# Patient Record
Sex: MALE | Race: WHITE
[De-identification: names, ages, dates, MRNs, and addresses within clinical notes are randomized per-mention and may not be internally consistent; named-entity substitution may affect disease eponyms.]

---

## 2021-04-02 ENCOUNTER — HOSPITAL ENCOUNTER (OUTPATIENT)
Dept: HOSPITAL 76 - COV | Age: 31
Discharge: HOME | End: 2021-04-02
Attending: FAMILY MEDICINE
Payer: COMMERCIAL

## 2021-04-02 DIAGNOSIS — R68.83: ICD-10-CM

## 2021-04-02 DIAGNOSIS — R06.02: Primary | ICD-10-CM

## 2021-04-02 DIAGNOSIS — M79.10: ICD-10-CM

## 2021-04-02 DIAGNOSIS — Z20.822: ICD-10-CM

## 2021-04-02 DIAGNOSIS — R53.83: ICD-10-CM

## 2021-04-02 DIAGNOSIS — J34.89: ICD-10-CM

## 2022-04-29 ENCOUNTER — HOSPITAL ENCOUNTER (OUTPATIENT)
Dept: HOSPITAL 76 - LAB.S | Age: 32
Discharge: HOME | End: 2022-04-29
Attending: NURSE PRACTITIONER
Payer: COMMERCIAL

## 2022-04-29 DIAGNOSIS — Z00.8: Primary | ICD-10-CM

## 2022-04-29 DIAGNOSIS — E66.9: ICD-10-CM

## 2022-04-29 LAB
ALBUMIN DIAFP-MCNC: 4.7 G/DL (ref 3.2–5.5)
ALBUMIN/GLOB SERPL: 1.7 {RATIO} (ref 1–2.2)
ALP SERPL-CCNC: 44 IU/L (ref 42–121)
ALT SERPL W P-5'-P-CCNC: 31 IU/L (ref 10–60)
ANION GAP SERPL CALCULATED.4IONS-SCNC: 6 MMOL/L (ref 6–13)
AST SERPL W P-5'-P-CCNC: 26 IU/L (ref 10–42)
BASOPHILS NFR BLD AUTO: 0.1 10^3/UL (ref 0–0.1)
BASOPHILS NFR BLD AUTO: 0.7 %
BILIRUB BLD-MCNC: 0.7 MG/DL (ref 0.2–1)
BUN SERPL-MCNC: 12 MG/DL (ref 6–20)
CALCIUM UR-MCNC: 10 MG/DL (ref 8.5–10.3)
CHLORIDE SERPL-SCNC: 102 MMOL/L (ref 101–111)
CHOLEST SERPL-MCNC: 182 MG/DL
CLARITY UR REFRACT.AUTO: CLEAR
CO2 SERPL-SCNC: 29 MMOL/L (ref 21–32)
CREAT SERPLBLD-SCNC: 1 MG/DL (ref 0.6–1.2)
EOSINOPHIL # BLD AUTO: 0.1 10^3/UL (ref 0–0.7)
EOSINOPHIL NFR BLD AUTO: 0.9 %
ERYTHROCYTE [DISTWIDTH] IN BLOOD BY AUTOMATED COUNT: 13.1 % (ref 12–15)
EST. AVERAGE GLUCOSE BLD GHB EST-MCNC: 103 MG/DL (ref 70–100)
GFRSERPLBLD MDRD-ARVRAT: 86 ML/MIN/{1.73_M2} (ref 89–?)
GLOBULIN SER-MCNC: 2.8 G/DL (ref 2.1–4.2)
GLUCOSE SERPL-MCNC: 77 MG/DL (ref 70–100)
GLUCOSE UR QL STRIP.AUTO: NEGATIVE MG/DL
HBA1C MFR BLD HPLC: 5.2 % (ref 4.27–6.07)
HCT VFR BLD AUTO: 44.5 % (ref 42–52)
HDLC SERPL-MCNC: 44 MG/DL
HDLC SERPL: 4.1 {RATIO} (ref ?–5)
HGB UR QL STRIP: 14.9 G/DL (ref 14–18)
KETONES UR QL STRIP.AUTO: NEGATIVE MG/DL
LDLC SERPL CALC-MCNC: 106 MG/DL
LDLC/HDLC SERPL: 2.4 {RATIO} (ref ?–3.6)
LYMPHOCYTES # SPEC AUTO: 2.3 10^3/UL (ref 1.5–3.5)
LYMPHOCYTES NFR BLD AUTO: 32.8 %
MCH RBC QN AUTO: 29.7 PG (ref 27–31)
MCHC RBC AUTO-ENTMCNC: 33.5 G/DL (ref 32–36)
MCV RBC AUTO: 88.8 FL (ref 80–94)
MONOCYTES # BLD AUTO: 0.4 10^3/UL (ref 0–1)
MONOCYTES NFR BLD AUTO: 5.5 %
NEUTROPHILS # BLD AUTO: 4.1 10^3/UL (ref 1.5–6.6)
NEUTROPHILS # SNV AUTO: 6.9 X10^3/UL (ref 4.8–10.8)
NEUTROPHILS NFR BLD AUTO: 60 %
NITRITE UR QL STRIP.AUTO: NEGATIVE
NRBC # BLD AUTO: 0 /100WBC
NRBC # BLD AUTO: 0 X10^3/UL
PDW BLD AUTO: 12.1 FL (ref 7.4–11.4)
PH UR STRIP.AUTO: 6 PH (ref 5–7.5)
PLATELET # BLD: 254 10^3/UL (ref 130–450)
POTASSIUM SERPL-SCNC: 4.1 MMOL/L (ref 3.5–5)
PROT SPEC-MCNC: 7.5 G/DL (ref 6.7–8.2)
PROT UR STRIP.AUTO-MCNC: NEGATIVE MG/DL
RBC # UR STRIP.AUTO: (no result) /UL
RBC # URNS HPF: (no result) /HPF (ref 0–5)
RBC MAR: 5.01 10^6/UL (ref 4.7–6.1)
SODIUM SERPLBLD-SCNC: 137 MMOL/L (ref 135–145)
SP GR UR STRIP.AUTO: 1.02 (ref 1–1.03)
SQUAMOUS URNS QL MICRO: (no result)
TRIGL P FAST SERPL-MCNC: 162 MG/DL
TSH SERPL-ACNC: 1.9 UIU/ML (ref 0.34–5.6)
UROBILINOGEN UR QL STRIP.AUTO: (no result) E.U./DL
UROBILINOGEN UR STRIP.AUTO-MCNC: NEGATIVE MG/DL
VLDLC SERPL-SCNC: 32 MG/DL
WBC # UR MANUAL: (no result) /HPF (ref 0–3)

## 2022-04-29 PROCEDURE — 80053 COMPREHEN METABOLIC PANEL: CPT

## 2022-04-29 PROCEDURE — 83036 HEMOGLOBIN GLYCOSYLATED A1C: CPT

## 2022-04-29 PROCEDURE — 87086 URINE CULTURE/COLONY COUNT: CPT

## 2022-04-29 PROCEDURE — 80061 LIPID PANEL: CPT

## 2022-04-29 PROCEDURE — 83721 ASSAY OF BLOOD LIPOPROTEIN: CPT

## 2022-04-29 PROCEDURE — 84443 ASSAY THYROID STIM HORMONE: CPT

## 2022-04-29 PROCEDURE — 36415 COLL VENOUS BLD VENIPUNCTURE: CPT

## 2022-04-29 PROCEDURE — 81001 URINALYSIS AUTO W/SCOPE: CPT

## 2022-04-29 PROCEDURE — 85025 COMPLETE CBC W/AUTO DIFF WBC: CPT

## 2023-03-06 ENCOUNTER — HOSPITAL ENCOUNTER (OUTPATIENT)
Dept: HOSPITAL 76 - DI.S | Age: 33
Discharge: HOME | End: 2023-03-06
Attending: PHYSICIAN ASSISTANT
Payer: COMMERCIAL

## 2023-03-06 DIAGNOSIS — R09.89: ICD-10-CM

## 2023-03-06 DIAGNOSIS — R05.8: Primary | ICD-10-CM

## 2023-03-06 NOTE — XRAY REPORT
PROCEDURE:  Chest 2 View X-Ray

 

INDICATIONS:  PRODUCTIVE COUGH

 

TECHNIQUE:  2 views of the chest were acquired.  

 

COMPARISON:  None.

 

FINDINGS:  

 

Surgical changes and devices:  None.  

 

Lungs and pleura:  No pleural effusions or pneumothorax.  Lungs are clear.  

 

Mediastinum:  Mediastinal contours are normal.  Heart size is normal.  

 

Bones and chest wall:  No suspicious bony abnormalities.  Soft tissues appear unremarkable.  

 

IMPRESSION:  

No acute pulmonary process.

 

Reviewed by: Jackie Lopez MD on 3/6/2023 10:02 AM PST

Approved by: Jackie Lopez MD on 3/6/2023 10:02 AM [facility]

 

 

Station ID:  [id number]

## 2023-05-10 ENCOUNTER — HOSPITAL ENCOUNTER (OUTPATIENT)
Dept: HOSPITAL 76 - LAB.S | Age: 33
Discharge: HOME | End: 2023-05-10
Attending: MASSAGE THERAPIST
Payer: COMMERCIAL

## 2023-05-10 DIAGNOSIS — F33.1: Primary | ICD-10-CM

## 2023-05-10 DIAGNOSIS — F41.9: ICD-10-CM

## 2023-05-10 DIAGNOSIS — F10.10: ICD-10-CM

## 2023-05-10 LAB
ALT SERPL W P-5'-P-CCNC: 53 IU/L (ref 10–60)
ANION GAP SERPL CALCULATED.4IONS-SCNC: 4 MMOL/L (ref 6–13)
AST SERPL W P-5'-P-CCNC: 32 IU/L (ref 10–42)
BASOPHILS NFR BLD AUTO: 0.1 10^3/UL (ref 0–0.1)
BASOPHILS NFR BLD AUTO: 0.9 %
BUN SERPL-MCNC: 12 MG/DL (ref 6–20)
CALCIUM UR-MCNC: 9.4 MG/DL (ref 8.5–10.3)
CHLORIDE SERPL-SCNC: 107 MMOL/L (ref 101–111)
CO2 SERPL-SCNC: 27 MMOL/L (ref 21–32)
CREAT SERPLBLD-SCNC: 0.8 MG/DL (ref 0.6–1.2)
EOSINOPHIL # BLD AUTO: 0.1 10^3/UL (ref 0–0.7)
EOSINOPHIL NFR BLD AUTO: 2.2 %
ERYTHROCYTE [DISTWIDTH] IN BLOOD BY AUTOMATED COUNT: 13 % (ref 12–15)
EST. AVERAGE GLUCOSE BLD GHB EST-MCNC: 108 MG/DL (ref 70–100)
FERRITIN SERPL-MCNC: 49.5 NG/ML (ref 23.9–336.2)
FOLATE SERPL-MCNC: 13.88 NG/ML
GFRSERPLBLD MDRD-ARVRAT: 110 ML/MIN/{1.73_M2} (ref 89–?)
GGT SERPL-CCNC: 56 IU/L (ref 8–55)
GLUCOSE SERPL-MCNC: 115 MG/DL (ref 70–100)
HBA1C MFR BLD HPLC: 5.4 % (ref 4.27–6.07)
HCT VFR BLD AUTO: 46.7 % (ref 42–52)
HGB UR QL STRIP: 15.3 G/DL (ref 14–18)
LYMPHOCYTES # SPEC AUTO: 2 10^3/UL (ref 1.5–3.5)
LYMPHOCYTES NFR BLD AUTO: 37.2 %
MCH RBC QN AUTO: 29.9 PG (ref 27–31)
MCHC RBC AUTO-ENTMCNC: 32.8 G/DL (ref 32–36)
MCV RBC AUTO: 91.4 FL (ref 80–94)
MONOCYTES # BLD AUTO: 0.3 10^3/UL (ref 0–1)
MONOCYTES NFR BLD AUTO: 6 %
NEUTROPHILS # BLD AUTO: 2.9 10^3/UL (ref 1.5–6.6)
NEUTROPHILS # SNV AUTO: 5.5 X10^3/UL (ref 4.8–10.8)
NEUTROPHILS NFR BLD AUTO: 53 %
NRBC # BLD AUTO: 0 /100WBC
NRBC # BLD AUTO: 0 X10^3/UL
PDW BLD AUTO: 11.7 FL (ref 7.4–11.4)
PLATELET # BLD: 255 10^3/UL (ref 130–450)
POTASSIUM SERPL-SCNC: 4.4 MMOL/L (ref 3.5–5)
RBC MAR: 5.11 10^6/UL (ref 4.7–6.1)
SODIUM SERPLBLD-SCNC: 138 MMOL/L (ref 135–145)
T3FREE SERPL-MCNC: 3.28 PG/ML (ref 2.5–3.9)
T3RU NFR SERPL: 40.1 % (ref 32–48.4)
T4 FREE SERPL-MCNC: 0.62 NG/DL (ref 0.58–1.64)
T4 SERPL-MCNC: 5.85 UG/DL (ref 6.09–12.23)
TSH SERPL-ACNC: 1.05 UIU/ML (ref 0.34–5.6)
VIT B12 SERPL-MCNC: 182 PG/ML (ref 180–914)

## 2023-05-10 PROCEDURE — 83036 HEMOGLOBIN GLYCOSYLATED A1C: CPT

## 2023-05-10 PROCEDURE — 84479 ASSAY OF THYROID (T3 OR T4): CPT

## 2023-05-10 PROCEDURE — 84443 ASSAY THYROID STIM HORMONE: CPT

## 2023-05-10 PROCEDURE — 84460 ALANINE AMINO (ALT) (SGPT): CPT

## 2023-05-10 PROCEDURE — 82746 ASSAY OF FOLIC ACID SERUM: CPT

## 2023-05-10 PROCEDURE — 82977 ASSAY OF GGT: CPT

## 2023-05-10 PROCEDURE — 36415 COLL VENOUS BLD VENIPUNCTURE: CPT

## 2023-05-10 PROCEDURE — 85025 COMPLETE CBC W/AUTO DIFF WBC: CPT

## 2023-05-10 PROCEDURE — 82306 VITAMIN D 25 HYDROXY: CPT

## 2023-05-10 PROCEDURE — 84481 FREE ASSAY (FT-3): CPT

## 2023-05-10 PROCEDURE — 82607 VITAMIN B-12: CPT

## 2023-05-10 PROCEDURE — 84436 ASSAY OF TOTAL THYROXINE: CPT

## 2023-05-10 PROCEDURE — 82947 ASSAY GLUCOSE BLOOD QUANT: CPT

## 2023-05-10 PROCEDURE — 84450 TRANSFERASE (AST) (SGOT): CPT

## 2023-05-10 PROCEDURE — 84439 ASSAY OF FREE THYROXINE: CPT

## 2023-05-10 PROCEDURE — 82728 ASSAY OF FERRITIN: CPT

## 2023-05-10 PROCEDURE — 80048 BASIC METABOLIC PNL TOTAL CA: CPT

## 2023-08-30 ENCOUNTER — HOSPITAL ENCOUNTER (OUTPATIENT)
Dept: HOSPITAL 76 - LAB.S | Age: 33
Discharge: HOME | End: 2023-08-30
Attending: MASSAGE THERAPIST
Payer: COMMERCIAL

## 2023-08-30 DIAGNOSIS — F10.10: ICD-10-CM

## 2023-08-30 DIAGNOSIS — F33.1: Primary | ICD-10-CM

## 2023-08-30 LAB
ALT SERPL W P-5'-P-CCNC: 29 IU/L (ref 10–60)
AST SERPL W P-5'-P-CCNC: 29 IU/L (ref 10–42)
EST. AVERAGE GLUCOSE BLD GHB EST-MCNC: 111 MG/DL (ref 70–100)
GLUCOSE SERPL-MCNC: 125 MG/DL (ref 74–104)
HBA1C MFR BLD HPLC: 5.5 % (ref 4.27–6.07)
T3RU NFR SERPL: 40 % (ref 32–48)
TSH SERPL-ACNC: 1.34 UIU/ML (ref 0.34–5.6)

## 2023-08-30 PROCEDURE — 84436 ASSAY OF TOTAL THYROXINE: CPT

## 2023-08-30 PROCEDURE — 84443 ASSAY THYROID STIM HORMONE: CPT

## 2023-08-30 PROCEDURE — 82947 ASSAY GLUCOSE BLOOD QUANT: CPT

## 2023-08-30 PROCEDURE — 83036 HEMOGLOBIN GLYCOSYLATED A1C: CPT

## 2023-08-30 PROCEDURE — 84481 FREE ASSAY (FT-3): CPT

## 2023-08-30 PROCEDURE — 84450 TRANSFERASE (AST) (SGOT): CPT

## 2023-08-30 PROCEDURE — 36415 COLL VENOUS BLD VENIPUNCTURE: CPT

## 2023-08-30 PROCEDURE — 84479 ASSAY OF THYROID (T3 OR T4): CPT

## 2023-08-30 PROCEDURE — 84460 ALANINE AMINO (ALT) (SGPT): CPT

## 2024-02-21 ENCOUNTER — HOSPITAL ENCOUNTER (OUTPATIENT)
Dept: HOSPITAL 76 - LAB.S | Age: 34
Discharge: HOME | End: 2024-02-21
Attending: PHYSICIAN ASSISTANT
Payer: COMMERCIAL

## 2024-02-21 DIAGNOSIS — Z13.9: Primary | ICD-10-CM

## 2024-02-21 LAB
ALBUMIN DIAFP-MCNC: 4.9 G/DL (ref 3.2–5.5)
ALBUMIN/GLOB SERPL: 1.7 {RATIO} (ref 1–2.2)
ALP SERPL-CCNC: 42 IU/L (ref 42–121)
ALT SERPL W P-5'-P-CCNC: 39 IU/L (ref 10–60)
ANION GAP SERPL CALCULATED.4IONS-SCNC: 8 MMOL/L (ref 6–13)
AST SERPL W P-5'-P-CCNC: 28 IU/L (ref 10–42)
BASOPHILS NFR BLD AUTO: 0.1 10^3/UL (ref 0–0.1)
BASOPHILS NFR BLD AUTO: 1 %
BILIRUB BLD-MCNC: 0.8 MG/DL (ref 0.2–1)
BUN SERPL-MCNC: 15 MG/DL (ref 6–20)
CALCIUM UR-MCNC: 10 MG/DL (ref 8.5–10.3)
CHLORIDE SERPL-SCNC: 102 MMOL/L (ref 101–111)
CHOLEST SERPL-MCNC: 249 MG/DL
CO2 SERPL-SCNC: 28 MMOL/L (ref 21–32)
CREAT SERPLBLD-SCNC: 0.9 MG/DL (ref 0.6–1.3)
EOSINOPHIL # BLD AUTO: 0.1 10^3/UL (ref 0–0.7)
EOSINOPHIL NFR BLD AUTO: 1.1 %
ERYTHROCYTE [DISTWIDTH] IN BLOOD BY AUTOMATED COUNT: 13.6 % (ref 12–15)
GFRSERPLBLD MDRD-ARVRAT: 95 ML/MIN/{1.73_M2} (ref 89–?)
GLOBULIN SER-MCNC: 2.9 G/DL (ref 2.1–4.2)
GLUCOSE SERPL-MCNC: 96 MG/DL (ref 74–104)
HCT VFR BLD AUTO: 47.5 % (ref 42–52)
HDLC SERPL-MCNC: 68 MG/DL
HDLC SERPL: 3.7 {RATIO} (ref ?–5)
HGB UR QL STRIP: 15.9 G/DL (ref 14–18)
LDLC SERPL CALC-MCNC: 110 MG/DL
LDLC/HDLC SERPL: 1.6 {RATIO} (ref ?–3.6)
LYMPHOCYTES # SPEC AUTO: 2.2 10^3/UL (ref 1.5–3.5)
LYMPHOCYTES NFR BLD AUTO: 30.5 %
MCH RBC QN AUTO: 31.7 PG (ref 27–31)
MCHC RBC AUTO-ENTMCNC: 33.5 G/DL (ref 32–36)
MCV RBC AUTO: 94.6 FL (ref 80–94)
MONOCYTES # BLD AUTO: 0.5 10^3/UL (ref 0–1)
MONOCYTES NFR BLD AUTO: 6.6 %
NEUTROPHILS # BLD AUTO: 4.3 10^3/UL (ref 1.5–6.6)
NEUTROPHILS # SNV AUTO: 7.1 X10^3/UL (ref 4.8–10.8)
NEUTROPHILS NFR BLD AUTO: 60.4 %
NRBC # BLD AUTO: 0 /100WBC
NRBC # BLD AUTO: 0 X10^3/UL
PDW BLD AUTO: 12.3 FL (ref 7.4–11.4)
PLATELET # BLD: 193 10^3/UL (ref 130–450)
POTASSIUM SERPL-SCNC: 4 MMOL/L (ref 3.5–4.5)
PROT SPEC-MCNC: 7.8 G/DL (ref 6.4–8.9)
RBC MAR: 5.02 10^6/UL (ref 4.7–6.1)
SODIUM SERPLBLD-SCNC: 138 MMOL/L (ref 135–145)
TRIGL P FAST SERPL-MCNC: 355 MG/DL (ref 48–352)
TSH SERPL-ACNC: 3.19 UIU/ML (ref 0.34–5.6)
VLDLC SERPL-SCNC: 71 MG/DL

## 2024-02-21 PROCEDURE — 83721 ASSAY OF BLOOD LIPOPROTEIN: CPT

## 2024-02-21 PROCEDURE — 84443 ASSAY THYROID STIM HORMONE: CPT

## 2024-02-21 PROCEDURE — 80061 LIPID PANEL: CPT

## 2024-02-21 PROCEDURE — 80053 COMPREHEN METABOLIC PANEL: CPT

## 2024-02-21 PROCEDURE — 36415 COLL VENOUS BLD VENIPUNCTURE: CPT

## 2024-02-21 PROCEDURE — 85025 COMPLETE CBC W/AUTO DIFF WBC: CPT

## 2024-04-15 ENCOUNTER — HOSPITAL ENCOUNTER (OUTPATIENT)
Dept: HOSPITAL 76 - DI.S | Age: 34
Discharge: HOME | End: 2024-04-15
Attending: PHYSICIAN ASSISTANT
Payer: COMMERCIAL

## 2024-04-15 DIAGNOSIS — M67.873: Primary | ICD-10-CM

## 2024-04-15 NOTE — XRAY REPORT
PROCEDURE:  Ankle 3+V RT

 

INDICATIONS:  DISORDER OF TENDON OF RIGHT ANKLE AND FOOT

 

TECHNIQUE:  3 views of the ankle were acquired.  

 

COMPARISON:  None.

 

FINDINGS:  

 

Bones:  No fractures or dislocations.  Ankle mortise is normally aligned.  No suspicious bony lesions
.  

 

Soft tissues:  No tibiotalar joint effusion.  Achilles tendon appears normal.  

 

 

IMPRESSION:  

 

No acute bony abnormality.

 

 

 

Reviewed by: Jose Carlos Avila MD on 4/15/2024 4:57 PM PDT

Approved by: Jose Carlos Avila MD on 4/15/2024 4:57 PM PDT

 

 

Station ID:  [id number]

## 2024-04-15 NOTE — XRAY REPORT
PROCEDURE:  Foot 3+V RT

 

INDICATIONS:  DISORDER OF TENDON OF RIGHT ANKLE AND FOOT

 

TECHNIQUE:  3 views of the foot were acquired.  

 

COMPARISON:  None.

 

FINDINGS:  

 

Bones:  No fractures or dislocations.  No suspicious bony lesions.  

 

Soft tissues:  No tibiotalar joint effusion.  Achilles tendon appears normal.  

 

IMPRESSION:  

No acute bony abnormality.

 

Reviewed by: Jose Carlos Avila MD on 4/15/2024 4:56 PM PDT

Approved by: Jose Carlos Avila MD on 4/15/2024 4:56 PM PDT

 

 

Station ID:  [id number]

## 2024-04-25 ENCOUNTER — HOSPITAL ENCOUNTER (OUTPATIENT)
Dept: HOSPITAL 76 - DI.S | Age: 34
Discharge: HOME | End: 2024-04-25
Attending: NURSE PRACTITIONER
Payer: COMMERCIAL

## 2024-04-25 DIAGNOSIS — M79.671: Primary | ICD-10-CM

## 2024-04-25 NOTE — XRAY REPORT
PROCEDURE:  Foot 3+V RT

 

INDICATIONS:  RIGHT FOOT PAIN

 

TECHNIQUE:  3 views of the foot were acquired.  

 

COMPARISON:  Right foot radiograph on April 15, 2024.

 

FINDINGS:  

 

Bones:  No fractures or dislocations.  Normal alignment on nonweightbearing view. Joint spaces are ma
intained. No suspicious bony lesions.  

 

Soft tissues:  No tibiotalar joint effusion.  Achilles tendon appears normal.  

 

IMPRESSION:  

No acute bony abnormality.

 

Reviewed by: Neptali Sifuentes MD on 4/25/2024 1:41 PM PDT

Approved by: Neptali Sifuentes MD on 4/25/2024 1:41 PM PDT

 

 

Station ID:  [id number]

## 2024-05-31 ENCOUNTER — HOSPITAL ENCOUNTER (OUTPATIENT)
Dept: HOSPITAL 76 - DI | Age: 34
Discharge: HOME | End: 2024-05-31
Attending: NURSE PRACTITIONER
Payer: COMMERCIAL

## 2024-05-31 DIAGNOSIS — M24.80: Primary | ICD-10-CM

## 2024-05-31 DIAGNOSIS — R93.6: ICD-10-CM

## 2024-05-31 DIAGNOSIS — M79.671: ICD-10-CM

## 2024-05-31 NOTE — MRI REPORT
PROCEDURE:  Foot RT WO

 

INDICATIONS:  R FOOT PAIN

 

TECHNIQUE:  

Noncontrast sagittal T1 spin echo and T2 fast spin echo with fat saturation, long-axis T1 spin echo a
nd T2 fast spin echo with fat saturation, short-axis proton density fast spin echo and T2 fast spin e
cho with fat saturation through the forefoot.  

 

COMPARISON:  None.

 

FINDINGS:  

Image quality:  Excellent.  

 

Bones and joints:  Mild osseous edema is seen at the 2nd and 3rd cuneiforms possibly related to osseo
us contusions. Mild edema is also seen at the plantar lateral aspect of the 1st metatarsal base near 
the insertion of the peroneus longus tendon. No metatarsal stress fractures.  The sesamoid bones appe
ar in expected positions, without internal edema.  No metatarsophalangeal joint degeneration.  No int
raosseous lesions.  

 

Soft tissues:  The visualized plantar foot muscles demonstrate normal signal and bulk.  Visualized fl
exor and extensor tendons appear intact, without tenosynovitis. Principal Lisfranc ligament is intact
. No soft tissue ganglion cysts or bursal fluid collections.  Sagittal images demonstrate no evidence
 for plantar plate tears.  

 

IMPRESSION:  

1.Small areas of osseous edema at the distal 2nd and 3rd cuneiforms and the plantar 1st metatarsal ba
se, which may be related to osseous contusions versus ligamentous/tendon traction or less likely dege
nerative changes.

 

2.No significant ligament or tendon tearing is seen.

 

 

Reviewed by: Marlon Duenas MD on 5/31/2024 3:16 PM PDT

Approved by: Marlon Duenas MD on 5/31/2024 3:16 PM PDT

 

 

Station ID:  [id number]